# Patient Record
Sex: MALE | Race: ASIAN | URBAN - METROPOLITAN AREA
[De-identification: names, ages, dates, MRNs, and addresses within clinical notes are randomized per-mention and may not be internally consistent; named-entity substitution may affect disease eponyms.]

---

## 2018-02-17 ENCOUNTER — OFFICE VISIT (OUTPATIENT)
Dept: FAMILY MEDICINE CLINIC | Facility: CLINIC | Age: 33
End: 2018-02-17

## 2018-02-17 VITALS
BODY MASS INDEX: 23.4 KG/M2 | SYSTOLIC BLOOD PRESSURE: 102 MMHG | WEIGHT: 158 LBS | HEIGHT: 69 IN | DIASTOLIC BLOOD PRESSURE: 66 MMHG | HEART RATE: 71 BPM

## 2018-02-17 DIAGNOSIS — Z00.00 ROUTINE MEDICAL EXAM: Primary | ICD-10-CM

## 2018-02-17 PROCEDURE — 99385 PREV VISIT NEW AGE 18-39: CPT | Performed by: FAMILY MEDICINE

## 2018-02-17 NOTE — PROGRESS NOTES
Katelyn Mccabe is a 28year old male who presents for a complete physical exam.   HPI:   Pt here for regular physical. Reports no concerns. Does not exercise regularly. Tries to eat healthy - vegetarian. No eggs. Does not take supplements.    Reports less murmur  GI: good BS's,no masses, HSM or tenderness  MUSCULOSKELETAL: back is not tender,FROM of the back  EXTREMITIES: no cyanosis, clubbing or edema  NEURO: Oriented times three,cranial nerves are intact,motor and sensory are grossly intact    ASSESSMENT

## 2018-02-19 LAB
ABSOLUTE BASOPHILS: 32 CELLS/UL (ref 0–200)
ABSOLUTE EOSINOPHILS: 183 CELLS/UL (ref 15–500)
ABSOLUTE LYMPHOCYTES: 2552 CELLS/UL (ref 850–3900)
ABSOLUTE MONOCYTES: 410 CELLS/UL (ref 200–950)
ABSOLUTE NEUTROPHILS: 3125 CELLS/UL (ref 1500–7800)
ALBUMIN/GLOBULIN RATIO: 2 (CALC) (ref 1–2.5)
ALBUMIN: 5.3 G/DL (ref 3.6–5.1)
ALKALINE PHOSPHATASE: 80 U/L (ref 40–115)
ALT: 16 U/L (ref 9–46)
AST: 14 U/L (ref 10–40)
BASOPHILS: 0.5 %
BILIRUBIN, TOTAL: 0.5 MG/DL (ref 0.2–1.2)
BUN: 16 MG/DL (ref 7–25)
CALCIUM: 10.2 MG/DL (ref 8.6–10.3)
CARBON DIOXIDE: 22 MMOL/L (ref 20–31)
CHLORIDE: 106 MMOL/L (ref 98–110)
CHOL/HDLC RATIO: 3.6 (CALC)
CHOLESTEROL, TOTAL: 197 MG/DL
CREATININE: 1.09 MG/DL (ref 0.6–1.35)
EGFR IF AFRICN AM: 104 ML/MIN/1.73M2
EGFR IF NONAFRICN AM: 89 ML/MIN/1.73M2
EOSINOPHILS: 2.9 %
GLOBULIN: 2.6 G/DL (CALC) (ref 1.9–3.7)
GLUCOSE: 91 MG/DL (ref 65–99)
HDL CHOLESTEROL: 54 MG/DL
HEMATOCRIT: 45.4 % (ref 38.5–50)
HEMOGLOBIN: 15.7 G/DL (ref 13.2–17.1)
LDL-CHOLESTEROL: 121 MG/DL (CALC)
LYMPHOCYTES: 40.5 %
MCH: 29.6 PG (ref 27–33)
MCHC: 34.6 G/DL (ref 32–36)
MCV: 85.5 FL (ref 80–100)
MONOCYTES: 6.5 %
MPV: 10.4 FL (ref 7.5–12.5)
NEUTROPHILS: 49.6 %
NON-HDL CHOLESTEROL: 143 MG/DL (CALC)
PLATELET COUNT: 232 THOUSAND/UL (ref 140–400)
POTASSIUM: 5 MMOL/L (ref 3.5–5.3)
PROTEIN, TOTAL: 7.9 G/DL (ref 6.1–8.1)
RDW: 13 % (ref 11–15)
RED BLOOD CELL COUNT: 5.31 MILLION/UL (ref 4.2–5.8)
SODIUM: 144 MMOL/L (ref 135–146)
TRIGLYCERIDES: 110 MG/DL
TSH W/REFLEX TO FT4: 2.13 MIU/L (ref 0.4–4.5)
VITAMIN B12: 294 PG/ML (ref 200–1100)
VITAMIN D, 25-OH, TOTAL: 15 NG/ML (ref 30–100)
WHITE BLOOD CELL COUNT: 6.3 THOUSAND/UL (ref 3.8–10.8)

## 2018-02-26 NOTE — PROGRESS NOTES
Labs normal except mildly elevated cholesterol - work on diet changes and increase exercise. Also vitamin D levels were slightly decreased. Please take over-the-counter vitamin D 2000 units daily.

## 2018-02-27 NOTE — PROGRESS NOTES
Cholesterol in a year with routine - very mildly elevated. Nothing of concern.  Vitamin D should take throughout winter months if living in [place]. Can take break from June to august.

## 2018-02-27 NOTE — PROGRESS NOTES
LB please advise, pt asking how long he should take vitamin D 2000u? Also asking when his lipids should be rechecked?   Pt stts he likes to know the plan ahead of time

## 2018-02-27 NOTE — PROGRESS NOTES
Thank you. Relayed MD orders below on pt's vm.   Pt stated during previous call that it is okay to leave him a vm with MD's reply